# Patient Record
Sex: MALE | Race: WHITE | NOT HISPANIC OR LATINO | URBAN - METROPOLITAN AREA
[De-identification: names, ages, dates, MRNs, and addresses within clinical notes are randomized per-mention and may not be internally consistent; named-entity substitution may affect disease eponyms.]

---

## 2017-08-29 ENCOUNTER — EMERGENCY (EMERGENCY)
Facility: HOSPITAL | Age: 52
LOS: 1 days | Discharge: HOME | End: 2017-08-29
Attending: EMERGENCY MEDICINE
Payer: COMMERCIAL

## 2017-08-29 VITALS
RESPIRATION RATE: 16 BRPM | TEMPERATURE: 98 F | DIASTOLIC BLOOD PRESSURE: 80 MMHG | SYSTOLIC BLOOD PRESSURE: 119 MMHG | OXYGEN SATURATION: 98 % | HEART RATE: 70 BPM

## 2017-08-29 VITALS
DIASTOLIC BLOOD PRESSURE: 81 MMHG | SYSTOLIC BLOOD PRESSURE: 133 MMHG | OXYGEN SATURATION: 98 % | RESPIRATION RATE: 16 BRPM | HEART RATE: 77 BPM | TEMPERATURE: 99 F | WEIGHT: 171.96 LBS

## 2017-08-29 PROCEDURE — 99283 EMERGENCY DEPT VISIT LOW MDM: CPT

## 2017-08-29 RX ORDER — ACYCLOVIR SODIUM 500 MG
400 VIAL (EA) INTRAVENOUS ONCE
Qty: 0 | Refills: 0 | Status: COMPLETED | OUTPATIENT
Start: 2017-08-29 | End: 2017-08-29

## 2017-08-29 RX ORDER — ACYCLOVIR SODIUM 500 MG
1 VIAL (EA) INTRAVENOUS
Qty: 50 | Refills: 0 | OUTPATIENT
Start: 2017-08-29 | End: 2017-09-08

## 2017-08-29 RX ADMIN — Medication 400 MILLIGRAM(S): at 21:24

## 2017-08-29 RX ADMIN — Medication 20 MILLIGRAM(S): at 21:23

## 2017-08-29 NOTE — ED ADULT NURSE NOTE - OBJECTIVE STATEMENT
52y male c/o right sided facial heaviness. A&Ox3, neuro intact, VSS. Reports traveling to [place] on Sunday. Upon returning ome yesterday, patient reports right sided facial "heaviness and numbness". States gradual onset of right sided headache yesterday, still reports headache today. Patient states numbness now traveling to left side of face. Patient reports difficulty speaking and minimal dizziness. Denies vision changes. Denies chest pain, sob, fever/chills, n/v/d. PERRLA 3mm, sensation intact. ambulates with marroquin steady gait.

## 2017-08-29 NOTE — ED PROVIDER NOTE - CRANIAL NERVE AND PUPILLARY EXAM
peripheral vision intact/extra-ocular movements intact/gag reflex intact/corneal reflex intact/central vision intact/cough reflex intact/central and peripheral vision intact/r facial droop noted involving upper and lower face only/tongue is midline/CORNEAL REFLEX NOT INTACT

## 2017-08-29 NOTE — ED ADULT NURSE REASSESSMENT NOTE - NS ED NURSE REASSESS COMMENT FT1
Patient dx [place] Palsy. Given acyclovir and prednisone. Discharged home and instructed to follow up with neurology. A&Ox3 and VSS

## 2017-08-29 NOTE — ED PROVIDER NOTE - CARE PLAN
Principal Discharge DX:	Bell's palsy  Instructions for follow-up, activity and diet:	You were seen in the Emergency Department for right facial droop. You have bell's palsy. Take the medications as written. Please follow up with neurology as soon as possible for further evaluation. Please return to the Emergency Department if you have any new concerning symptoms such as severe pain, weakness, shortness of breath or any other concerning symptoms.

## 2017-08-29 NOTE — ED PROVIDER NOTE - OBJECTIVE STATEMENT
51yo male p/w right facial droop. Pt. was in [place] on SUnday. YEsterday, pt. reports right sided headache and heaviness in face. Last night, pt. felt persistent heaviness to right face and noticed right facial droop. Headache gradual in onset. Denies fevesr, chest pain, shortness of breath, changes in vision/hearing, falls/trauma. Pt. reports high stress due to family.

## 2017-08-29 NOTE — ED ADULT NURSE NOTE - CHPI ED SYMPTOMS NEG
no nausea/no vomiting/no loss of consciousness/no blurred vision/no change in level of consciousness

## 2017-08-29 NOTE — ED PROVIDER NOTE - ATTENDING CONTRIBUTION TO CARE
51yo male p/w right facial droop. for one day with bell's palsy noted, walked into ed, r upper and lower facial involvement, neuro exam non focal other wise, motor strength 5/5 b/l, treat with steroids, antiviral, outpt neuro follow up.

## 2017-08-29 NOTE — ED PROVIDER NOTE - PLAN OF CARE
You were seen in the Emergency Department for right facial droop. You have bell's palsy. Take the medications as written. Please follow up with neurology as soon as possible for further evaluation. Please return to the Emergency Department if you have any new concerning symptoms such as severe pain, weakness, shortness of breath or any other concerning symptoms.

## 2017-12-31 PROBLEM — Z00.00 ENCOUNTER FOR PREVENTIVE HEALTH EXAMINATION: Status: ACTIVE | Noted: 2017-12-31

## 2018-01-30 ENCOUNTER — APPOINTMENT (OUTPATIENT)
Dept: NEUROLOGY | Facility: CLINIC | Age: 53
End: 2018-01-30

## 2025-02-06 ENCOUNTER — OUTPATIENT (OUTPATIENT)
Dept: OUTPATIENT SERVICES | Facility: HOSPITAL | Age: 60
LOS: 1 days | End: 2025-02-06
Payer: MEDICAID

## 2025-02-06 ENCOUNTER — APPOINTMENT (OUTPATIENT)
Dept: RADIOLOGY | Facility: CLINIC | Age: 60
End: 2025-02-06

## 2025-02-06 DIAGNOSIS — Z00.8 ENCOUNTER FOR OTHER GENERAL EXAMINATION: ICD-10-CM

## 2025-02-06 PROCEDURE — 71046 X-RAY EXAM CHEST 2 VIEWS: CPT

## 2025-02-06 PROCEDURE — 71046 X-RAY EXAM CHEST 2 VIEWS: CPT | Mod: 26
